# Patient Record
Sex: MALE | Race: WHITE | NOT HISPANIC OR LATINO | Employment: OTHER | URBAN - METROPOLITAN AREA
[De-identification: names, ages, dates, MRNs, and addresses within clinical notes are randomized per-mention and may not be internally consistent; named-entity substitution may affect disease eponyms.]

---

## 2017-01-04 ENCOUNTER — ANTICOAGULATION MONITORING (OUTPATIENT)
Dept: VASCULAR LAB | Facility: MEDICAL CENTER | Age: 82
End: 2017-01-04

## 2017-01-04 DIAGNOSIS — I48.20 CHRONIC ATRIAL FIBRILLATION (HCC): ICD-10-CM

## 2017-01-04 DIAGNOSIS — G45.9 TRANSIENT CEREBRAL ISCHEMIA, UNSPECIFIED TYPE: ICD-10-CM

## 2017-01-04 LAB — INR PPP: 2.5 (ref 2–3.5)

## 2017-01-23 LAB — INR PPP: 2.5 (ref 2–3.5)

## 2017-01-30 ENCOUNTER — ANTICOAGULATION MONITORING (OUTPATIENT)
Dept: VASCULAR LAB | Facility: MEDICAL CENTER | Age: 82
End: 2017-01-30

## 2017-01-30 DIAGNOSIS — G45.9 TRANSIENT CEREBRAL ISCHEMIA, UNSPECIFIED TYPE: ICD-10-CM

## 2017-01-30 DIAGNOSIS — I48.20 CHRONIC ATRIAL FIBRILLATION (HCC): ICD-10-CM

## 2017-01-30 NOTE — PROGRESS NOTES
Anticoagulation Summary as of 1/30/2017     INR goal 2.0-3.0   Selected INR 2.5 (1/23/2017)   Maintenance plan 5 mg (5 mg x 1) every day   Weekly total 35 mg   Plan last modified Estelle Malone PHARMD (3/2/2016)   Next INR check 2/27/2017   Target end date Indefinite    Indications   Chronic atrial fibrillation (CMS-HCC) [I48.2]  TIA (transient ischemic attack) [G45.9]         Anticoagulation Episode Summary     INR check location Home Draw    Preferred lab     Send INR reminders to     Yadira Donaldson       Anticoagulation Care Providers     Provider Role Specialty Phone number    Shalom Garzon M.D. Referring Cardiology [phone]    [facility] Anticoagulation Services Responsible  [phone]    Fermín Green PHARMD Responsible          Anticoagulation Patient Findings    Left voicemail message to report a therapeutic INR of 2.5.  Pt to continue with current warfarin dosing regimen. Requested pt contact the clinic for any s/s of unusual bleeding, bruising, clotting or any changes to diet or medication. FU 4 weeks.  Femrín Green PHARMD

## 2017-03-15 ENCOUNTER — TELEPHONE (OUTPATIENT)
Dept: VASCULAR LAB | Facility: MEDICAL CENTER | Age: 82
End: 2017-03-15

## 2017-03-23 ENCOUNTER — OFFICE VISIT (OUTPATIENT)
Dept: CARDIOLOGY | Facility: PHYSICIAN GROUP | Age: 82
End: 2017-03-23
Payer: MEDICARE

## 2017-03-23 VITALS
HEART RATE: 73 BPM | SYSTOLIC BLOOD PRESSURE: 82 MMHG | BODY MASS INDEX: 31.18 KG/M2 | HEIGHT: 74 IN | DIASTOLIC BLOOD PRESSURE: 60 MMHG | WEIGHT: 243 LBS | OXYGEN SATURATION: 93 %

## 2017-03-23 DIAGNOSIS — I35.8 AORTIC VALVE SCLEROSIS: ICD-10-CM

## 2017-03-23 DIAGNOSIS — I34.0 MILD MITRAL REGURGITATION: ICD-10-CM

## 2017-03-23 DIAGNOSIS — I48.20 CHRONIC ATRIAL FIBRILLATION (HCC): ICD-10-CM

## 2017-03-23 DIAGNOSIS — I27.20 PULMONARY HYPERTENSION (HCC): ICD-10-CM

## 2017-03-23 DIAGNOSIS — E80.6 HYPERBILIRUBINEMIA: ICD-10-CM

## 2017-03-23 DIAGNOSIS — G60.3 IDIOPATHIC PROGRESSIVE NEUROPATHY: ICD-10-CM

## 2017-03-23 DIAGNOSIS — E78.00 HYPERCHOLESTEROLEMIA: ICD-10-CM

## 2017-03-23 DIAGNOSIS — I34.0 NON-RHEUMATIC MITRAL REGURGITATION: ICD-10-CM

## 2017-03-23 DIAGNOSIS — Z79.01 CHRONIC ANTICOAGULATION: ICD-10-CM

## 2017-03-23 LAB — INR PPP: 2.7 (ref 2–3.5)

## 2017-03-23 PROCEDURE — 1036F TOBACCO NON-USER: CPT | Performed by: INTERNAL MEDICINE

## 2017-03-23 PROCEDURE — 4040F PNEUMOC VAC/ADMIN/RCVD: CPT | Mod: 8P | Performed by: INTERNAL MEDICINE

## 2017-03-23 PROCEDURE — G8432 DEP SCR NOT DOC, RNG: HCPCS | Performed by: INTERNAL MEDICINE

## 2017-03-23 PROCEDURE — G8484 FLU IMMUNIZE NO ADMIN: HCPCS | Performed by: INTERNAL MEDICINE

## 2017-03-23 PROCEDURE — 1101F PT FALLS ASSESS-DOCD LE1/YR: CPT | Mod: 8P | Performed by: INTERNAL MEDICINE

## 2017-03-23 PROCEDURE — 99214 OFFICE O/P EST MOD 30 MIN: CPT | Performed by: INTERNAL MEDICINE

## 2017-03-23 PROCEDURE — G8419 CALC BMI OUT NRM PARAM NOF/U: HCPCS | Performed by: INTERNAL MEDICINE

## 2017-03-23 RX ORDER — LISINOPRIL 2.5 MG/1
2.5 TABLET ORAL DAILY
Qty: 30 TAB | Refills: 6 | Status: SHIPPED | OUTPATIENT
Start: 2017-03-23 | End: 2018-01-09

## 2017-03-23 ASSESSMENT — ENCOUNTER SYMPTOMS
ORTHOPNEA: 0
BRUISES/BLEEDS EASILY: 0
FALLS: 0
WHEEZING: 0
BLOOD IN STOOL: 0
MYALGIAS: 0
CHILLS: 0
FEVER: 0
FLANK PAIN: 0
COUGH: 0

## 2017-03-23 ASSESSMENT — LIFESTYLE VARIABLES: SUBSTANCE_ABUSE: 0

## 2017-03-23 NOTE — PROGRESS NOTES
Subjective:   Troy Roberto is a 82 y.o. male who presents today for follow-up of his atrial fibrillation and anticoagulation. He has not been in in a year and a half and is getting gradually weaker and more easily confused and frequently somnolent during the day. His wife is not sure if he actually has sleep apnea or not because he is a restless sleeper. He has not had dyspnea nor orthopnea and has had no chest pain. He has not had syncope nor near syncope and has had no recurrence in the past 3 years of his transient ischemic attack symptoms. He does continue reliable follow-up of anticoagulation.    Past Medical History   Diagnosis Date   • Aortic valve disorders    • Atrial fibrillation (CMS-HCC)    • Long term (current) use of anticoagulants    • Disorders of bilirubin excretion    • Pure hypercholesterolemia    • Mitral valve disorders    • Pulmonary hypertension (CMS-HCC)    • TIA (transient ischemic attack) 2014     30 minutes of dysphasia      Past Surgical History   Procedure Laterality Date   • Shoulder surgery     • Tonsillectomy       Family History   Problem Relation Age of Onset   • Stroke Mother       at 83   • Heart Failure Father       at 84, also has h/o CVA     History   Smoking status   • Never Smoker    Smokeless tobacco   • Never Used     No Known Allergies  Outpatient Encounter Prescriptions as of 3/23/2017   Medication Sig Dispense Refill   • pravastatin (PRAVACHOL) 40 MG tablet Take 1 Tab by mouth every evening. 90 Tab 3   • oxybutynin (DITROPAN) 5 MG TABS Take 5 mg by mouth every bedtime.     • warfarin (COUMADIN) 5 MG TABS Take 5 mg by mouth every bedtime.     • finasteride (PROSCAR) 5 MG TABS Take 5 mg by mouth every day.     • lisinopril (PRINIVIL) 10 MG TABS Take 5 mg by mouth every day.     • metoprolol (LOPRESSOR) 50 MG TABS Take 25 mg by mouth 2 times a day.     This is his last medication list with me but we are not confident that this is accurate and his wife  "will bring in a new medication list.  No facility-administered encounter medications on file as of 3/23/2017.     Review of Systems   Constitutional: Negative for fever and chills.   HENT: Negative for nosebleeds.    Respiratory: Negative for cough and wheezing.    Cardiovascular: Negative for orthopnea.   Gastrointestinal: Negative for blood in stool and melena.   Genitourinary: Negative for hematuria and flank pain.   Musculoskeletal: Negative for myalgias and falls.   Endo/Heme/Allergies: Does not bruise/bleed easily.   Psychiatric/Behavioral: Negative for substance abuse.        Objective:   BP 82/60 mmHg  Pulse 73  Ht 1.88 m (6' 2\")  Wt 110.224 kg (243 lb)  BMI 31.19 kg/m2  SpO2 93%    Physical Exam   Constitutional: He is oriented to person, place, and time. He appears well-developed and well-nourished.   His gait is very limited and requires a walker.   Eyes: Conjunctivae are normal. No scleral icterus.   Neck: No JVD present.   Cardiovascular: Normal rate, S1 normal, S2 normal and intact distal pulses.  An irregularly irregular rhythm present. Exam reveals no gallop.    Murmur (2/6sem) heard.  Pulmonary/Chest: Effort normal and breath sounds normal.   Musculoskeletal: He exhibits no edema.   Chronic osteoarthritis of both hands.   Neurological: He is alert and oriented to person, place, and time.   Skin: Skin is warm and dry.   Psychiatric: He has a normal mood and affect.   He does seem more forgetful than previously       Assessment:     1. Chronic atrial fibrillation (CMS-HCC)  COMP METABOLIC PANEL    TSH   2. Pulmonary hypertension (CMS-HCC)  OVERNIGHT PULSE OXIMETRY   3. Non-rheumatic mitral regurgitation     4. Aortic valve sclerosis     5. Mild mitral regurgitation     6. Chronic anticoagulation  CBC WITH DIFFERENTIAL   7. Hypercholesterolemia  LIPID PROFILE   8. Hyperbilirubinemia     9. Idiopathic progressive neuropathy       Blood pressure seems definitely overcontrolled. We need an accurate " medication list but in any case need to drop the dose of lisinopril. Heart rate control is good and valve status is clinically stable but pulmonary hypertension needs reassessment. His peripheral neuropathy was never explained and I do not have any of the previous neurological evaluations. His gradually worsening cognitive dysfunction needs to be reassessed as well and may be at least in part due to nocturnal hypoxia.  Medical Decision Making:  Today's Assessment / Status / Plan:     Reduce lisinopril to 2.5 mg daily and get an accurate med list. Follow-up of this next month. Echocardiogram in [place] to reassess his valvular disease. Overnight pulse oximetry to assess the possibility of sleep apnea and begin reevaluation of his cognitive dysfunction as well as pulmonary hypertension. I suggested neurological reevaluation but he and his wife both state that they saw somebody in [place] and also somebody in [place] (I have no records of that) and a neurologist at [facility] all to no avail.  His B12 level has been tested several times as well as toxicology as part of the neurology evaluations.  I do have records of the B12 levels that I drew to workup his macrocytosis and they were normal. I also wanted them to get a comprehensive primary reevaluation but it has been hard for them to get in to see Basil for some reason. If they are going down to [place] to visit their daughter and I strongly suggest a revisit at [facility] neurology.

## 2017-03-23 NOTE — Clinical Note
[facility] [address]  Phone: [phone]  Fax: [phone]              Troy Roberto  1934    Encounter Date: 3/23/2017    Shalom Garzon M.D.          PROGRESS NOTE:  Subjective:   Troy Roberto is a 82 y.o. male who presents today for follow-up of his atrial fibrillation and anticoagulation. He has not been in in a year and a half and is getting gradually weaker and more easily confused and frequently somnolent during the day. His wife is not sure if he actually has sleep apnea or not because he is a restless sleeper. He has not had dyspnea nor orthopnea and has had no chest pain. He has not had syncope nor near syncope and has had no recurrence in the past 3 years of his transient ischemic attack symptoms. He does continue reliable follow-up of anticoagulation.    Past Medical History   Diagnosis Date   • Aortic valve disorders    • Atrial fibrillation (CMS-HCC)    • Long term (current) use of anticoagulants    • Disorders of bilirubin excretion    • Pure hypercholesterolemia    • Mitral valve disorders    • Pulmonary hypertension (CMS-HCC)    • TIA (transient ischemic attack) 2014     30 minutes of dysphasia      Past Surgical History   Procedure Laterality Date   • Shoulder surgery     • Tonsillectomy       Family History   Problem Relation Age of Onset   • Stroke Mother       at 83   • Heart Failure Father       at 84, also has h/o CVA     History   Smoking status   • Never Smoker    Smokeless tobacco   • Never Used     No Known Allergies  Outpatient Encounter Prescriptions as of 3/23/2017   Medication Sig Dispense Refill   • pravastatin (PRAVACHOL) 40 MG tablet Take 1 Tab by mouth every evening. 90 Tab 3   • oxybutynin (DITROPAN) 5 MG TABS Take 5 mg by mouth every bedtime.     • warfarin (COUMADIN) 5 MG TABS Take 5 mg by mouth every bedtime.     • finasteride (PROSCAR) 5 MG TABS Take 5  "mg by mouth every day.     • lisinopril (PRINIVIL) 10 MG TABS Take 5 mg by mouth every day.     • metoprolol (LOPRESSOR) 50 MG TABS Take 25 mg by mouth 2 times a day.     This is his last medication list with me but we are not confident that this is accurate and his wife will bring in a new medication list.  No facility-administered encounter medications on file as of 3/23/2017.     Review of Systems   Constitutional: Negative for fever and chills.   HENT: Negative for nosebleeds.    Respiratory: Negative for cough and wheezing.    Cardiovascular: Negative for orthopnea.   Gastrointestinal: Negative for blood in stool and melena.   Genitourinary: Negative for hematuria and flank pain.   Musculoskeletal: Negative for myalgias and falls.   Endo/Heme/Allergies: Does not bruise/bleed easily.   Psychiatric/Behavioral: Negative for substance abuse.        Objective:   BP 82/60 mmHg  Pulse 73  Ht 1.88 m (6' 2\")  Wt 110.224 kg (243 lb)  BMI 31.19 kg/m2  SpO2 93%    Physical Exam   Constitutional: He is oriented to person, place, and time. He appears well-developed and well-nourished.   His gait is very limited and requires a walker.   Eyes: Conjunctivae are normal. No scleral icterus.   Neck: No JVD present.   Cardiovascular: Normal rate, S1 normal, S2 normal and intact distal pulses.  An irregularly irregular rhythm present. Exam reveals no gallop.    Murmur (2/6sem) heard.  Pulmonary/Chest: Effort normal and breath sounds normal.   Musculoskeletal: He exhibits no edema.   Chronic osteoarthritis of both hands.   Neurological: He is alert and oriented to person, place, and time.   Skin: Skin is warm and dry.   Psychiatric: He has a normal mood and affect.   He does seem more forgetful than previously       Assessment:     1. Chronic atrial fibrillation (CMS-HCC)  COMP METABOLIC PANEL    TSH   2. Pulmonary hypertension (CMS-HCC)  OVERNIGHT PULSE OXIMETRY   3. Non-rheumatic mitral regurgitation     4. Aortic valve " sclerosis     5. Mild mitral regurgitation     6. Chronic anticoagulation  CBC WITH DIFFERENTIAL   7. Hypercholesterolemia  LIPID PROFILE   8. Hyperbilirubinemia     9. Idiopathic progressive neuropathy       Blood pressure seems definitely overcontrolled. We need an accurate medication list but in any case need to drop the dose of lisinopril. Heart rate control is good and valve status is clinically stable but pulmonary hypertension needs reassessment. His peripheral neuropathy was never explained and I do not have any of the previous neurological evaluations. His gradually worsening cognitive dysfunction needs to be reassessed as well and may be at least in part due to nocturnal hypoxia.  Medical Decision Making:  Today's Assessment / Status / Plan:     Reduce lisinopril to 2.5 mg daily and get an accurate med list. Follow-up of this next month. Echocardiogram in [place] to reassess his valvular disease. Overnight pulse oximetry to assess the possibility of sleep apnea and begin reevaluation of his cognitive dysfunction as well as pulmonary hypertension. I suggested neurological reevaluation but he and his wife both state that they saw somebody in [place] and also somebody in [place] (I have no records of that) and a neurologist at [facility] all to no avail.  His B12 level has been tested several times as well as toxicology as part of the neurology evaluations.  I do have records of the B12 levels that I drew to workup his macrocytosis and they were normal. I also wanted them to get a comprehensive primary reevaluation but it has been hard for them to get in to see Basil for some reason. If they are going down to [place] to visit their daughter and I strongly suggest a revisit at [facility] neurology.      No Recipients

## 2017-03-24 DIAGNOSIS — I34.0 NON-RHEUMATIC MITRAL REGURGITATION: ICD-10-CM

## 2017-04-04 ENCOUNTER — TELEPHONE (OUTPATIENT)
Dept: VASCULAR LAB | Facility: MEDICAL CENTER | Age: 82
End: 2017-04-04

## 2017-04-04 NOTE — TELEPHONE ENCOUNTER
Left message with patient wife for pt to have INR checked  Left message for pt to have INR checked

## 2017-04-18 DIAGNOSIS — I34.0 NON-RHEUMATIC MITRAL REGURGITATION: ICD-10-CM

## 2017-04-19 ENCOUNTER — TELEPHONE (OUTPATIENT)
Dept: VASCULAR LAB | Facility: MEDICAL CENTER | Age: 82
End: 2017-04-19

## 2017-05-02 ENCOUNTER — ANTICOAGULATION MONITORING (OUTPATIENT)
Dept: VASCULAR LAB | Facility: MEDICAL CENTER | Age: 82
End: 2017-05-02

## 2017-05-02 DIAGNOSIS — G45.1 HEMISPHERIC CAROTID ARTERY SYNDROME: ICD-10-CM

## 2017-05-02 DIAGNOSIS — I48.20 CHRONIC ATRIAL FIBRILLATION (HCC): ICD-10-CM

## 2017-05-08 ENCOUNTER — TELEPHONE (OUTPATIENT)
Dept: CARDIOLOGY | Facility: MEDICAL CENTER | Age: 82
End: 2017-05-08

## 2017-05-08 NOTE — TELEPHONE ENCOUNTER
----- Message from Shalom Garzon M.D. sent at 5/6/2017 11:54 AM PDT -----  Please let Ancelmo know that the lab and echo were good.

## 2017-05-17 ENCOUNTER — TELEPHONE (OUTPATIENT)
Dept: VASCULAR LAB | Facility: MEDICAL CENTER | Age: 82
End: 2017-05-17

## 2017-05-17 DIAGNOSIS — I48.20 CHRONIC ATRIAL FIBRILLATION (HCC): ICD-10-CM

## 2017-05-17 NOTE — TELEPHONE ENCOUNTER
Left message for pt to have INR checked.  Patients wife having trouble getting patient around, will try to test in next week or two.  Updated SO faxed to [facility] [facility].  Fermín Green, KAID

## 2017-05-30 LAB — INR PPP: 1.1 (ref 2–3.5)

## 2017-05-31 ENCOUNTER — ANTICOAGULATION MONITORING (OUTPATIENT)
Dept: VASCULAR LAB | Facility: MEDICAL CENTER | Age: 82
End: 2017-05-31

## 2017-05-31 DIAGNOSIS — I48.20 CHRONIC ATRIAL FIBRILLATION (HCC): ICD-10-CM

## 2017-05-31 DIAGNOSIS — G45.1 HEMISPHERIC CAROTID ARTERY SYNDROME: ICD-10-CM

## 2017-05-31 NOTE — PROGRESS NOTES
Anticoagulation Summary as of 5/31/2017     INR goal 2.0-3.0   Selected INR 1.1! (5/30/2017)   Maintenance plan 5 mg (5 mg x 1) every day   Weekly total 35 mg   Plan last modified Estelle Malone PHARMD (3/2/2016)   Next INR check 6/5/2017   Target end date Indefinite    Indications   Chronic atrial fibrillation (CMS-HCC) [I48.2]  TIA (transient ischemic attack) [G45.9]         Anticoagulation Episode Summary     INR check location Home Draw    Preferred lab     Send INR reminders to     Yadira Donaldson       Anticoagulation Care Providers     Provider Role Specialty Phone number    Shalom Garzon M.D. Referring Cardiology [phone]    [facility] Anticoagulation Services Responsible  [phone]    Fermín Green PHARMD Responsible          Anticoagulation Patient Findings   Positives Missed Doses        Spoke with patients wife Maral today regarding subtherapeutic INR of 1.1.  Patient denies any signs/symptoms of bruising or bleeding or any changes in diet and medications.  Instructed patient to call clinic with any questions or concerns.  She is unsure if patient has missed doses and how many.  She states he has dementia and is becoming harder to manage.  Encouraged her to have him establish with new PCP to begin discussing treatment options and possibly place referral for specialist.  Given patient's history of TIA, encouraged bridging with lovenox.  She is not interested at this time based on their personal situation.  She understands risks.  Instructed patient to bolus with 10mg X 2, then resume current warfarin regimen.  Follow up in 5 days per patient.    Fermín Green PHARMD

## 2017-06-05 ENCOUNTER — ANTICOAGULATION MONITORING (OUTPATIENT)
Dept: VASCULAR LAB | Facility: MEDICAL CENTER | Age: 82
End: 2017-06-05

## 2017-06-05 DIAGNOSIS — G45.1 HEMISPHERIC CAROTID ARTERY SYNDROME: ICD-10-CM

## 2017-06-05 DIAGNOSIS — I48.20 CHRONIC ATRIAL FIBRILLATION (HCC): ICD-10-CM

## 2017-06-05 LAB — INR PPP: 1.3 (ref 2–3.5)

## 2017-06-06 NOTE — PROGRESS NOTES
OP Anticoagulation Service Note    Date: 6/5/2017    Anticoagulation Summary as of 6/5/2017     INR goal 2.0-3.0   Selected INR 1.3! (6/5/2017)   Maintenance plan 7.5 mg (5 mg x 1.5) on Mon, Wed, Fri; 5 mg (5 mg x 1) all other days   Weekly total 42.5 mg   Plan last modified Estelle Malone PHARMD (6/5/2017)   Next INR check 6/12/2017   Target end date Indefinite    Indications   Chronic atrial fibrillation (CMS-HCC) [I48.2]  TIA (transient ischemic attack) [G45.9]         Anticoagulation Episode Summary     INR check location Home Draw    Preferred lab     Send INR reminders to     Comments Anika       Anticoagulation Care Providers     Provider Role Specialty Phone number    Shalom Garzon M.D. Referring Cardiology [phone]    [facility] Anticoagulation Services Responsible  [phone]    Fermín Green PHARMD Responsible          Anticoagulation Patient Findings      Plan:  INR is low today despite 2 bolus doses last week. Spoke with pt and his wife on the phone.   Confirmed he followed dosing instructions. Confirmed tablet size. He denies V8 juice, or protein shakes. No changes in medication recently. Patient denies sign/symptoms of bleeding/clotting. No recent medication changes and patient has been eating a consistent diet.  Instructed pt to call clinic with any concerns of bleeding or thrombosis. Instructed pt to take 10 mg x 2 doses then begin increased weekly regimen. Pt is aware of risk of subtherapeutic INR, declines bridging. WIll seek immediate attention for s/s of stroke.  Follow up in 1 week(s)      Estelle Malone PHARMD

## 2017-06-29 ENCOUNTER — OFFICE VISIT (OUTPATIENT)
Dept: CARDIOLOGY | Facility: PHYSICIAN GROUP | Age: 82
End: 2017-06-29
Payer: MEDICARE

## 2017-06-29 VITALS
WEIGHT: 238 LBS | SYSTOLIC BLOOD PRESSURE: 100 MMHG | HEIGHT: 74 IN | DIASTOLIC BLOOD PRESSURE: 60 MMHG | HEART RATE: 70 BPM | BODY MASS INDEX: 30.54 KG/M2

## 2017-06-29 DIAGNOSIS — I48.20 CHRONIC ATRIAL FIBRILLATION (HCC): ICD-10-CM

## 2017-06-29 DIAGNOSIS — I27.20 PULMONARY HYPERTENSION (HCC): ICD-10-CM

## 2017-06-29 DIAGNOSIS — Z79.01 CHRONIC ANTICOAGULATION: ICD-10-CM

## 2017-06-29 PROCEDURE — 99213 OFFICE O/P EST LOW 20 MIN: CPT | Performed by: INTERNAL MEDICINE

## 2017-06-29 ASSESSMENT — ENCOUNTER SYMPTOMS
ORTHOPNEA: 0
WHEEZING: 0
COUGH: 0
MYALGIAS: 0
PND: 0

## 2017-06-29 ASSESSMENT — LIFESTYLE VARIABLES: SUBSTANCE_ABUSE: 0

## 2017-06-29 NOTE — PROGRESS NOTES
"Subjective:   Troy Roberto is a 83 y.o. male who presents today for follow-up of his pulmonary hypertension and atrial fibrillation. The overnight pulse oximetry never got done.  His other studies were done and were very satisfactory.  Past Medical History   Diagnosis Date   • Aortic valve disorders    • Atrial fibrillation (CMS-HCC)    • Long term (current) use of anticoagulants    • Disorders of bilirubin excretion    • Pure hypercholesterolemia    • Mitral valve disorders    • Pulmonary hypertension (CMS-HCC)    • TIA (transient ischemic attack) 2014     30 minutes of dysphasia      Past Surgical History   Procedure Laterality Date   • Shoulder surgery     • Tonsillectomy       Family History   Problem Relation Age of Onset   • Stroke Mother       at 83   • Heart Failure Father       at 84, also has h/o CVA     History   Smoking status   • Never Smoker    Smokeless tobacco   • Never Used     No Known Allergies  Outpatient Encounter Prescriptions as of 2017   Medication Sig Dispense Refill   • lisinopril (PRINIVIL) 2.5 MG Tab Take 1 Tab by mouth every day. 30 Tab 6   • pravastatin (PRAVACHOL) 40 MG tablet Take 1 Tab by mouth every evening. 90 Tab 3   • oxybutynin (DITROPAN) 5 MG TABS Take 5 mg by mouth every bedtime.     • warfarin (COUMADIN) 5 MG TABS Take 5 mg by mouth every bedtime.     • finasteride (PROSCAR) 5 MG TABS Take 5 mg by mouth every day.     • metoprolol (LOPRESSOR) 50 MG TABS Take 25 mg by mouth 2 times a day.       No facility-administered encounter medications on file as of 2017.     Review of Systems   Respiratory: Negative for cough and wheezing.    Cardiovascular: Negative for orthopnea and PND.   Musculoskeletal: Negative for myalgias and joint pain.   Psychiatric/Behavioral: Negative for substance abuse.        Objective:   /60 mmHg  Pulse 70  Ht 1.88 m (6' 2\")  Wt 107.956 kg (238 lb)  BMI 30.54 kg/m2    Physical Exam   Constitutional: He is oriented " to person, place, and time. He appears well-developed and well-nourished.   His gait is very limited and requires a walker.   Eyes: Conjunctivae are normal. No scleral icterus.   Neck: No JVD present.   Cardiovascular: Normal rate, S1 normal, S2 normal and intact distal pulses.  An irregularly irregular rhythm present. Exam reveals no gallop.    Murmur (2/6sem) heard.  Pulmonary/Chest: Effort normal and breath sounds normal.   Musculoskeletal: He exhibits no edema.   Chronic osteoarthritis of both hands.   Neurological: He is alert and oriented to person, place, and time.   Skin: Skin is warm and dry.   Psychiatric: He has a normal mood and affect.       Assessment:     1. Chronic atrial fibrillation (CMS-HCC)     2. Pulmonary hypertension (CMS-HCC)     3. Chronic anticoagulation       The above assessed cardiovascular problems are clinically stable.  He is followed the in the anticoag clinic.  Medical Decision Making:  Today's Assessment / Status / Plan:     Continue the current cardiovascular regimen.  Get the OPO.  Continue primary follow up with  Dr. Riojas.   Cardiology follow up in 3 months and  sooner if needed for any change. Use of the emergency medical system reviewed.

## 2017-06-29 NOTE — Clinical Note
[facility] [address]  Phone: [phone]  Fax: [phone]              Troy Roberto  1934    Encounter Date: 2017    Shalom Garzon M.D.          PROGRESS NOTE:  Subjective:   Troy Roberto is a 83 y.o. male who presents today for follow-up of his pulmonary hypertension and atrial fibrillation. The overnight pulse oximetry never got done.  His other studies were done and were very satisfactory.  Past Medical History   Diagnosis Date   • Aortic valve disorders    • Atrial fibrillation (CMS-HCC)    • Long term (current) use of anticoagulants    • Disorders of bilirubin excretion    • Pure hypercholesterolemia    • Mitral valve disorders    • Pulmonary hypertension (CMS-HCC)    • TIA (transient ischemic attack) 2014     30 minutes of dysphasia      Past Surgical History   Procedure Laterality Date   • Shoulder surgery     • Tonsillectomy       Family History   Problem Relation Age of Onset   • Stroke Mother       at 83   • Heart Failure Father       at 84, also has h/o CVA     History   Smoking status   • Never Smoker    Smokeless tobacco   • Never Used     No Known Allergies  Outpatient Encounter Prescriptions as of 2017   Medication Sig Dispense Refill   • lisinopril (PRINIVIL) 2.5 MG Tab Take 1 Tab by mouth every day. 30 Tab 6   • pravastatin (PRAVACHOL) 40 MG tablet Take 1 Tab by mouth every evening. 90 Tab 3   • oxybutynin (DITROPAN) 5 MG TABS Take 5 mg by mouth every bedtime.     • warfarin (COUMADIN) 5 MG TABS Take 5 mg by mouth every bedtime.     • finasteride (PROSCAR) 5 MG TABS Take 5 mg by mouth every day.     • metoprolol (LOPRESSOR) 50 MG TABS Take 25 mg by mouth 2 times a day.       No facility-administered encounter medications on file as of 2017.     Review of Systems   Respiratory: Negative for cough and wheezing.    Cardiovascular: Negative for orthopnea and  "PND.   Musculoskeletal: Negative for myalgias and joint pain.   Psychiatric/Behavioral: Negative for substance abuse.        Objective:   /60 mmHg  Pulse 70  Ht 1.88 m (6' 2\")  Wt 107.956 kg (238 lb)  BMI 30.54 kg/m2    Physical Exam   Constitutional: He is oriented to person, place, and time. He appears well-developed and well-nourished.   His gait is very limited and requires a walker.   Eyes: Conjunctivae are normal. No scleral icterus.   Neck: No JVD present.   Cardiovascular: Normal rate, S1 normal, S2 normal and intact distal pulses.  An irregularly irregular rhythm present. Exam reveals no gallop.    Murmur (2/6sem) heard.  Pulmonary/Chest: Effort normal and breath sounds normal.   Musculoskeletal: He exhibits no edema.   Chronic osteoarthritis of both hands.   Neurological: He is alert and oriented to person, place, and time.   Skin: Skin is warm and dry.   Psychiatric: He has a normal mood and affect.       Assessment:     1. Chronic atrial fibrillation (CMS-HCC)     2. Pulmonary hypertension (CMS-HCC)     3. Chronic anticoagulation       The above assessed cardiovascular problems are clinically stable.  He is followed the in the [facility] clinic.  Medical Decision Making:  Today's Assessment / Status / Plan:     Continue the current cardiovascular regimen.  Get the OPO.  Continue primary follow up with  Dr. Riojas.   Cardiology follow up in 3 months and  sooner if needed for any change. Use of the emergency medical system reviewed.       No Recipients                "

## 2017-06-30 ENCOUNTER — TELEPHONE (OUTPATIENT)
Dept: CARDIOLOGY | Facility: MEDICAL CENTER | Age: 82
End: 2017-06-30

## 2017-08-02 ENCOUNTER — TELEPHONE (OUTPATIENT)
Dept: VASCULAR LAB | Facility: MEDICAL CENTER | Age: 82
End: 2017-08-02

## 2017-08-23 ENCOUNTER — ANTICOAGULATION MONITORING (OUTPATIENT)
Dept: VASCULAR LAB | Facility: MEDICAL CENTER | Age: 82
End: 2017-08-23

## 2017-08-23 DIAGNOSIS — I48.20 CHRONIC ATRIAL FIBRILLATION (HCC): ICD-10-CM

## 2017-08-23 DIAGNOSIS — G45.9 TRANSIENT CEREBRAL ISCHEMIA, UNSPECIFIED TYPE: ICD-10-CM

## 2017-09-07 ENCOUNTER — TELEPHONE (OUTPATIENT)
Dept: VASCULAR LAB | Facility: MEDICAL CENTER | Age: 82
End: 2017-09-07

## 2017-09-07 NOTE — TELEPHONE ENCOUNTER
Left message for pt to have INR checked.  Letter of compliance mailed to home address as well.  Fermín Green, PharmD

## 2017-10-05 LAB — INR PPP: 1 (ref 2–3.5)

## 2017-10-06 ENCOUNTER — ANTICOAGULATION MONITORING (OUTPATIENT)
Dept: VASCULAR LAB | Facility: MEDICAL CENTER | Age: 82
End: 2017-10-06

## 2017-10-06 DIAGNOSIS — I48.20 CHRONIC ATRIAL FIBRILLATION (HCC): ICD-10-CM

## 2017-10-13 ENCOUNTER — OFFICE VISIT (OUTPATIENT)
Dept: CARDIOLOGY | Facility: PHYSICIAN GROUP | Age: 82
End: 2017-10-13
Payer: MEDICARE

## 2017-10-13 VITALS
BODY MASS INDEX: 29.39 KG/M2 | HEIGHT: 74 IN | DIASTOLIC BLOOD PRESSURE: 80 MMHG | OXYGEN SATURATION: 98 % | HEART RATE: 95 BPM | SYSTOLIC BLOOD PRESSURE: 112 MMHG | WEIGHT: 229 LBS

## 2017-10-13 DIAGNOSIS — M79.10 MYALGIA: ICD-10-CM

## 2017-10-13 DIAGNOSIS — I48.20 CHRONIC ATRIAL FIBRILLATION (HCC): ICD-10-CM

## 2017-10-13 DIAGNOSIS — Z79.01 CHRONIC ANTICOAGULATION: ICD-10-CM

## 2017-10-13 DIAGNOSIS — M19.90 ARTHRITIS: ICD-10-CM

## 2017-10-13 DIAGNOSIS — E78.00 HYPERCHOLESTEROLEMIA: ICD-10-CM

## 2017-10-13 PROCEDURE — 99213 OFFICE O/P EST LOW 20 MIN: CPT | Performed by: INTERNAL MEDICINE

## 2017-10-13 ASSESSMENT — ENCOUNTER SYMPTOMS
HEMOPTYSIS: 0
ORTHOPNEA: 0
PND: 0
COUGH: 0
MYALGIAS: 1
BLOOD IN STOOL: 0

## 2017-10-13 NOTE — PROGRESS NOTES
Subjective:   Troy Roberto is a 83 y.o. male who presents today for atrial fibrillation and his anticoagulation. Cardiac status has been clinically stable but his arthritis and general myalgias have been worse. He has not had dyspnea. He never did get the OPO but did see neurology and is following through with workup of peripheral neuropathy.    Past Medical History:   Diagnosis Date   • TIA (transient ischemic attack) 2014    30 minutes of dysphasia    • Aortic valve disorders    • Atrial fibrillation (CMS-HCC)    • Disorders of bilirubin excretion    • Long term (current) use of anticoagulants    • Mitral valve disorders(424.0)    • Pulmonary hypertension    • Pure hypercholesterolemia      Past Surgical History:   Procedure Laterality Date   • SHOULDER SURGERY     • TONSILLECTOMY       Family History   Problem Relation Age of Onset   • Stroke Mother       at 83   • Heart Failure Father       at 84, also has h/o CVA     History   Smoking Status   • Never Smoker   Smokeless Tobacco   • Never Used     Not on File  Outpatient Encounter Prescriptions as of 10/13/2017   Medication Sig Dispense Refill   • lisinopril (PRINIVIL) 2.5 MG Tab Take 1 Tab by mouth every day. 30 Tab 6   • pravastatin (PRAVACHOL) 40 MG tablet Take 1 Tab by mouth every evening. 90 Tab 3   • oxybutynin (DITROPAN) 5 MG TABS Take 5 mg by mouth every bedtime.     • warfarin (COUMADIN) 5 MG TABS Take 5 mg by mouth every bedtime.     • finasteride (PROSCAR) 5 MG TABS Take 5 mg by mouth every day.     • metoprolol (LOPRESSOR) 50 MG TABS Take 25 mg by mouth 2 times a day.       No facility-administered encounter medications on file as of 10/13/2017.      Review of Systems   HENT: Negative for nosebleeds.    Respiratory: Negative for cough and hemoptysis.    Cardiovascular: Negative for orthopnea and PND.   Gastrointestinal: Negative for blood in stool and melena.   Musculoskeletal: Positive for joint pain and myalgias.         "Objective:   /80   Pulse 95   Ht 1.88 m (6' 2\")   Wt 103.9 kg (229 lb)   SpO2 98%   BMI 29.40 kg/m²     Physical Exam   Constitutional: He is oriented to person, place, and time. He appears well-developed and well-nourished.   He requires a walker.   Eyes: Conjunctivae are normal. No scleral icterus.   Neck: No JVD present.   Cardiovascular: Normal rate, S1 normal, S2 normal and intact distal pulses.  An irregularly irregular rhythm present. Exam reveals no gallop.    Murmur (2/6sem) heard.  Pulmonary/Chest: Effort normal and breath sounds normal.   Musculoskeletal: He exhibits no edema.   Arthritis appears worse   Neurological: He is alert and oriented to person, place, and time.   Skin: Skin is warm and dry.   Psychiatric: He has a normal mood and affect.       Assessment:     1. Chronic atrial fibrillation (CMS-HCC)     2. Chronic anticoagulation     3. Arthritis     4. Myalgia     5. Hypercholesterolemia       The above assessed cardiovascular problems are clinically stable. The myalgias and arthritis need f/u  Medical Decision Making:  Today's Assessment / Status / Plan:   Continue the current cardiovascular regimen.  Continue primary follow up with  Dr. Riojas.   Cardiology follow up in 3 months and  sooner if needed for any change.   Lab before next visit with Basil: CBC,CMP,ESR,LP,TSH.  Use of the emergency medical system reviewed.   Addendum: it turns out he did not stop the pravastatin after lab and did have fall and rhabdomyolysis was noted.  Pravastatin has now been stopped and he is much better from the myopathy but still has severe arthritis.  "

## 2017-10-13 NOTE — LETTER
[facility] [address]  Phone: [phone]  Fax: [phone]              Troy Roberto  1934    Encounter Date: 10/13/2017    Shalom Garzon M.D.          PROGRESS NOTE:  Subjective:   Troy Roberto is a 83 y.o. male who presents today for atrial fibrillation and his anticoagulation. Cardiac status has been clinically stable but his arthritis and general myalgias have been worse. He has not had dyspnea. He never did get the OPO but did see neurology and is following through with workup of peripheral neuropathy.    Past Medical History:   Diagnosis Date   • TIA (transient ischemic attack) 2014    30 minutes of dysphasia    • Aortic valve disorders    • Atrial fibrillation (CMS-HCC)    • Disorders of bilirubin excretion    • Long term (current) use of anticoagulants    • Mitral valve disorders(424.0)    • Pulmonary hypertension    • Pure hypercholesterolemia      Past Surgical History:   Procedure Laterality Date   • SHOULDER SURGERY     • TONSILLECTOMY       Family History   Problem Relation Age of Onset   • Stroke Mother       at 83   • Heart Failure Father       at 84, also has h/o CVA     History   Smoking Status   • Never Smoker   Smokeless Tobacco   • Never Used     Not on File  Outpatient Encounter Prescriptions as of 10/13/2017   Medication Sig Dispense Refill   • lisinopril (PRINIVIL) 2.5 MG Tab Take 1 Tab by mouth every day. 30 Tab 6   • pravastatin (PRAVACHOL) 40 MG tablet Take 1 Tab by mouth every evening. 90 Tab 3   • oxybutynin (DITROPAN) 5 MG TABS Take 5 mg by mouth every bedtime.     • warfarin (COUMADIN) 5 MG TABS Take 5 mg by mouth every bedtime.     • finasteride (PROSCAR) 5 MG TABS Take 5 mg by mouth every day.     • metoprolol (LOPRESSOR) 50 MG TABS Take 25 mg by mouth 2 times a day.       No facility-administered encounter medications on file as of 10/13/2017.      Review of  "Systems   HENT: Negative for nosebleeds.    Respiratory: Negative for cough and hemoptysis.    Cardiovascular: Negative for orthopnea and PND.   Gastrointestinal: Negative for blood in stool and melena.   Musculoskeletal: Positive for joint pain and myalgias.        Objective:   /80   Pulse 95   Ht 1.88 m (6' 2\")   Wt 103.9 kg (229 lb)   SpO2 98%   BMI 29.40 kg/m²      Physical Exam   Constitutional: He is oriented to person, place, and time. He appears well-developed and well-nourished.   He requires a walker.   Eyes: Conjunctivae are normal. No scleral icterus.   Neck: No JVD present.   Cardiovascular: Normal rate, S1 normal, S2 normal and intact distal pulses.  An irregularly irregular rhythm present. Exam reveals no gallop.    Murmur (2/6sem) heard.  Pulmonary/Chest: Effort normal and breath sounds normal.   Musculoskeletal: He exhibits no edema.   Arthritis appears worse   Neurological: He is alert and oriented to person, place, and time.   Skin: Skin is warm and dry.   Psychiatric: He has a normal mood and affect.       Assessment:     1. Chronic atrial fibrillation (CMS-HCC)     2. Chronic anticoagulation     3. Arthritis     4. Myalgia     5. Hypercholesterolemia       The above assessed cardiovascular problems are clinically stable. The myalgias and arthritis need f/u  Medical Decision Making:  Today's Assessment / Status / Plan:   Continue the current cardiovascular regimen.  Continue primary follow up with  Dr. Riojas.   Cardiology follow up in 3 months and  sooner if needed for any change.   Lab before next visit with Basil: CBC,CMP,ESR,LP,TSH.  Use of the emergency medical system reviewed.       Basil Riojas M.D.  [address] [facility] [address]  VIA Facsimile: [phone]                 "

## 2017-10-25 ENCOUNTER — TELEPHONE (OUTPATIENT)
Dept: VASCULAR LAB | Facility: MEDICAL CENTER | Age: 82
End: 2017-10-25

## 2017-11-21 ENCOUNTER — TELEPHONE (OUTPATIENT)
Dept: VASCULAR LAB | Facility: MEDICAL CENTER | Age: 82
End: 2017-11-21

## 2017-12-14 ENCOUNTER — TELEPHONE (OUTPATIENT)
Dept: CARDIOLOGY | Facility: PHYSICIAN GROUP | Age: 82
End: 2017-12-14

## 2017-12-14 PROBLEM — G72.0 STATIN MYOPATHY: Status: ACTIVE | Noted: 2017-12-14

## 2017-12-14 PROBLEM — T46.6X5A STATIN MYOPATHY: Status: ACTIVE | Noted: 2017-12-14

## 2017-12-15 NOTE — TELEPHONE ENCOUNTER
Progressive dementia. He had a fall and was hospitalized and had significant rhabdomyolysis. I thought he had stopped his pravastatin after the lab came back in October with a normal sedimentation rate but Maral is pretty sure that he had not.  We discussed it today and she thinks he has been pretty confused.  In any case, pravastatin has been permanently discontinued.  He is doing very well now and is alert and oriented.  It is not clear that he didn't have a fall and prolonged down time which caused this but the myalgias were clearly preceding this event.

## 2018-01-04 ENCOUNTER — ANTICOAGULATION MONITORING (OUTPATIENT)
Dept: VASCULAR LAB | Facility: MEDICAL CENTER | Age: 83
End: 2018-01-04

## 2018-01-04 DIAGNOSIS — I48.20 CHRONIC ATRIAL FIBRILLATION (HCC): ICD-10-CM

## 2018-01-04 DIAGNOSIS — G45.9 TRANSIENT CEREBRAL ISCHEMIA, UNSPECIFIED TYPE: ICD-10-CM

## 2018-01-04 NOTE — PROGRESS NOTES
Patient's anticoagulation now managed by [facility].  Pending further contact from patient or PCP, will discharge from anticoagulation clinic.  Fermín Green, EstebanD

## 2018-01-09 ENCOUNTER — OFFICE VISIT (OUTPATIENT)
Dept: CARDIOLOGY | Facility: PHYSICIAN GROUP | Age: 83
End: 2018-01-09
Payer: MEDICARE

## 2018-01-09 VITALS
WEIGHT: 210 LBS | HEIGHT: 74 IN | DIASTOLIC BLOOD PRESSURE: 70 MMHG | HEART RATE: 137 BPM | BODY MASS INDEX: 26.95 KG/M2 | SYSTOLIC BLOOD PRESSURE: 124 MMHG | OXYGEN SATURATION: 98 %

## 2018-01-09 DIAGNOSIS — I48.20 CHRONIC ATRIAL FIBRILLATION (HCC): ICD-10-CM

## 2018-01-09 DIAGNOSIS — Z79.01 CHRONIC ANTICOAGULATION: ICD-10-CM

## 2018-01-09 DIAGNOSIS — T46.6X5A STATIN MYOPATHY: ICD-10-CM

## 2018-01-09 DIAGNOSIS — G45.9 TRANSIENT CEREBRAL ISCHEMIA, UNSPECIFIED TYPE: ICD-10-CM

## 2018-01-09 DIAGNOSIS — G72.0 STATIN MYOPATHY: ICD-10-CM

## 2018-01-09 DIAGNOSIS — I35.0 MILD AORTIC STENOSIS: Chronic | ICD-10-CM

## 2018-01-09 PROCEDURE — 99214 OFFICE O/P EST MOD 30 MIN: CPT | Performed by: INTERNAL MEDICINE

## 2018-01-09 RX ORDER — FUROSEMIDE 20 MG/1
20 TABLET ORAL 2 TIMES DAILY
COMMUNITY

## 2018-01-09 RX ORDER — MIRABEGRON 50 MG/1
TABLET, FILM COATED, EXTENDED RELEASE ORAL
Refills: 0 | COMMUNITY
Start: 2017-12-17

## 2018-01-09 RX ORDER — DILTIAZEM HYDROCHLORIDE 120 MG/1
120 CAPSULE, COATED, EXTENDED RELEASE ORAL DAILY
COMMUNITY

## 2018-01-09 RX ORDER — POTASSIUM CHLORIDE 750 MG/1
10 TABLET, EXTENDED RELEASE ORAL 2 TIMES DAILY
COMMUNITY

## 2018-01-09 RX ORDER — GABAPENTIN 300 MG/1
300 CAPSULE ORAL 3 TIMES DAILY
COMMUNITY

## 2018-01-09 NOTE — LETTER
[facility] [address]  Phone: [phone]  Fax: [phone]              Troy Roberto  1934    Encounter Date: 2018    Moose Tyler M.D.          PROGRESS NOTE:  Subjective:   Troy Roberto is a 83 y.o. male who presents today for follow-up of her history of mild aortic valve, left bundle branch block, hypertension    He had a fall in December with elevated CPKs statin was stopped he follows with neurology      Past Medical History:   Diagnosis Date   • Aortic valve disorders    • Atrial fibrillation (CMS-HCC)    • Disorders of bilirubin excretion    • Long term (current) use of anticoagulants    • Mild aortic stenosis    • Mitral valve disorders(424.0)    • Pulmonary hypertension    • Pure hypercholesterolemia    • TIA (transient ischemic attack) 2014    30 minutes of dysphasia      Past Surgical History:   Procedure Laterality Date   • SHOULDER SURGERY     • TONSILLECTOMY       Family History   Problem Relation Age of Onset   • Stroke Mother       at 83   • Heart Failure Father       at 84, also has h/o CVA     History   Smoking Status   • Never Smoker   Smokeless Tobacco   • Never Used     Not on File  Outpatient Encounter Prescriptions as of 2018   Medication Sig Dispense Refill   • MYRBETRIQ 50 MG TABLET SR 24 HR GIVE 1 TABLET BY MOUTH DAILY  0   • gabapentin (NEURONTIN) 300 MG Cap Take 300 mg by mouth 3 times a day.     • diltiazem CD (CARDIZEM CD) 120 MG CAPSULE SR 24 HR Take 120 mg by mouth every day.     • furosemide (LASIX) 20 MG Tab Take 20 mg by mouth 2 times a day.     • potassium chloride SA (K-DUR) 10 MEQ Tab CR Take 10 mEq by mouth 2 times a day.     • warfarin (COUMADIN) 5 MG TABS Take 5 mg by mouth every bedtime. 4.5mg     • [DISCONTINUED] lisinopril (PRINIVIL) 2.5 MG Tab Take 1 Tab by mouth every day. 30 Tab 6   • [DISCONTINUED] oxybutynin (DITROPAN) 5 MG TABS  "Take 5 mg by mouth every bedtime.     • [DISCONTINUED] finasteride (PROSCAR) 5 MG TABS Take 5 mg by mouth every day.     • [DISCONTINUED] metoprolol (LOPRESSOR) 50 MG TABS Take 25 mg by mouth 2 times a day.       No facility-administered encounter medications on file as of 1/9/2018.      Review of Systems   Constitutional: Negative for chills and fever.   HENT: Negative for sore throat.    Eyes: Negative for blurred vision.   Respiratory: Negative for cough and shortness of breath.    Cardiovascular: Negative for chest pain, palpitations, claudication, leg swelling and PND.   Gastrointestinal: Negative for abdominal pain and nausea.   Musculoskeletal: Negative for falls and joint pain.   Skin: Negative for rash.   Neurological: Negative for dizziness, focal weakness and weakness.   Endo/Heme/Allergies: Does not bruise/bleed easily.        Objective:   /70   Pulse (!) 137   Ht 1.88 m (6' 2\")   Wt 95.3 kg (210 lb)   SpO2 98%   BMI 26.96 kg/m²      Physical Exam   Constitutional: No distress.   HENT:   Mouth/Throat: Oropharynx is clear and moist.   Eyes: No scleral icterus.   Neck: Neck supple. No JVD present.   Cardiovascular: Normal rate, regular rhythm, normal heart sounds and intact distal pulses.  Exam reveals no gallop and no friction rub.    No murmur heard.  Pulmonary/Chest: Effort normal. He has no rales.   Abdominal: Soft. Bowel sounds are normal. There is no tenderness.   Musculoskeletal: He exhibits no edema.   Neurological: He is alert.   Skin: No rash noted. He is not diaphoretic.   Psychiatric: He has a normal mood and affect.       Assessment:     1. Transient cerebral ischemia, unspecified type     2. Mild aortic stenosis     3. Chronic atrial fibrillation (CMS-HCC)     4. Chronic anticoagulation  COMP METABOLIC PANEL    CBC WITH DIFFERENTIAL   5. Statin myopathy  TSH WITH REFLEX TO FT4    WESTERGREN SED RATE    CPK - TOTAL SERUM       Medical Decision Making:  Today's Assessment / Status / " Plan:     It was my pleasure to meet with Mr. Roberto.    His blood pressure is well controlled    We will update his labs    I will see Mr. Roberto back in 1 year time and encouraged him to follow up with us over the phone or e-mail using my MyChart as issues arise.    It is my pleasure to participate in the care of Mr. Roberto.  Please do not hesitate to contact me with questions or concerns.    Moose Tyler MD PhD FAC  Cardiologist [facility]        Basil Riojas M.D.  [address] [place] [id number]  VIA Facsimile: [phone]

## 2018-01-15 ASSESSMENT — ENCOUNTER SYMPTOMS
ABDOMINAL PAIN: 0
PND: 0
FALLS: 0
BRUISES/BLEEDS EASILY: 0
BLURRED VISION: 0
NAUSEA: 0
COUGH: 0
CHILLS: 0
WEAKNESS: 0
FOCAL WEAKNESS: 0
CLAUDICATION: 0
SHORTNESS OF BREATH: 0
SORE THROAT: 0
PALPITATIONS: 0
DIZZINESS: 0
FEVER: 0

## 2018-01-15 NOTE — PROGRESS NOTES
Subjective:   Troy Roberto is a 83 y.o. male who presents today for follow-up of her history of mild aortic valve, left bundle branch block, hypertension    He had a fall in December with elevated CPKs statin was stopped he follows with neurology      Past Medical History:   Diagnosis Date   • Aortic valve disorders    • Atrial fibrillation (CMS-HCC)    • Disorders of bilirubin excretion    • Long term (current) use of anticoagulants    • Mild aortic stenosis    • Mitral valve disorders(424.0)    • Pulmonary hypertension    • Pure hypercholesterolemia    • TIA (transient ischemic attack) 2014    30 minutes of dysphasia      Past Surgical History:   Procedure Laterality Date   • SHOULDER SURGERY     • TONSILLECTOMY       Family History   Problem Relation Age of Onset   • Stroke Mother       at 83   • Heart Failure Father       at 84, also has h/o CVA     History   Smoking Status   • Never Smoker   Smokeless Tobacco   • Never Used     Not on File  Outpatient Encounter Prescriptions as of 2018   Medication Sig Dispense Refill   • MYRBETRIQ 50 MG TABLET SR 24 HR GIVE 1 TABLET BY MOUTH DAILY  0   • gabapentin (NEURONTIN) 300 MG Cap Take 300 mg by mouth 3 times a day.     • diltiazem CD (CARDIZEM CD) 120 MG CAPSULE SR 24 HR Take 120 mg by mouth every day.     • furosemide (LASIX) 20 MG Tab Take 20 mg by mouth 2 times a day.     • potassium chloride SA (K-DUR) 10 MEQ Tab CR Take 10 mEq by mouth 2 times a day.     • warfarin (COUMADIN) 5 MG TABS Take 5 mg by mouth every bedtime. 4.5mg     • [DISCONTINUED] lisinopril (PRINIVIL) 2.5 MG Tab Take 1 Tab by mouth every day. 30 Tab 6   • [DISCONTINUED] oxybutynin (DITROPAN) 5 MG TABS Take 5 mg by mouth every bedtime.     • [DISCONTINUED] finasteride (PROSCAR) 5 MG TABS Take 5 mg by mouth every day.     • [DISCONTINUED] metoprolol (LOPRESSOR) 50 MG TABS Take 25 mg by mouth 2 times a day.       No facility-administered encounter medications on file as of  "1/9/2018.      Review of Systems   Constitutional: Negative for chills and fever.   HENT: Negative for sore throat.    Eyes: Negative for blurred vision.   Respiratory: Negative for cough and shortness of breath.    Cardiovascular: Negative for chest pain, palpitations, claudication, leg swelling and PND.   Gastrointestinal: Negative for abdominal pain and nausea.   Musculoskeletal: Negative for falls and joint pain.   Skin: Negative for rash.   Neurological: Negative for dizziness, focal weakness and weakness.   Endo/Heme/Allergies: Does not bruise/bleed easily.        Objective:   /70   Pulse (!) 137   Ht 1.88 m (6' 2\")   Wt 95.3 kg (210 lb)   SpO2 98%   BMI 26.96 kg/m²     Physical Exam   Constitutional: No distress.   HENT:   Mouth/Throat: Oropharynx is clear and moist.   Eyes: No scleral icterus.   Neck: Neck supple. No JVD present.   Cardiovascular: Normal rate, regular rhythm, normal heart sounds and intact distal pulses.  Exam reveals no gallop and no friction rub.    No murmur heard.  Pulmonary/Chest: Effort normal. He has no rales.   Abdominal: Soft. Bowel sounds are normal. There is no tenderness.   Musculoskeletal: He exhibits no edema.   Neurological: He is alert.   Skin: No rash noted. He is not diaphoretic.   Psychiatric: He has a normal mood and affect.       Assessment:     1. Transient cerebral ischemia, unspecified type     2. Mild aortic stenosis     3. Chronic atrial fibrillation (CMS-HCC)     4. Chronic anticoagulation  COMP METABOLIC PANEL    CBC WITH DIFFERENTIAL   5. Statin myopathy  TSH WITH REFLEX TO FT4    WESTERGREN SED RATE    CPK - TOTAL SERUM       Medical Decision Making:  Today's Assessment / Status / Plan:     It was my pleasure to meet with Mr. Roberto.    His blood pressure is well controlled    We will update his labs    I will see Mr. Roberto back in 1 year time and encouraged him to follow up with us over the phone or e-mail using my MyChart as issues arise.    It " is my pleasure to participate in the care of Mr. Roberto.  Please do not hesitate to contact me with questions or concerns.    Moose yTler MD PhD [facility]  Cardiologist [facility]

## 2021-06-30 NOTE — PROGRESS NOTES
OP Anticoagulation Telephone Note    Date: 1/4/2017  Anticoagulation Summary as of 1/4/2017     INR goal 2.0-3.0    Selected INR 2.5 (12/27/2016)    Maintenance plan 5 mg (5 mg x 1) every day    Weekly total 35 mg    No change documented Cathy Stephens, Med Ass't    Plan last modified Estelle Malone PHARMD (3/2/2016)    Next INR check 1/10/2017    Target end date Indefinite      Anticoagulation Episode Summary     INR check location Home [facility]    Preferred lab     Send INR reminders to     Yadira Donaldson       Anticoagulation Care Providers     Provider Role Specialty Phone number    Shalom Garzon M.D. Referring Cardiology [phone]    [facility] Responsible  [phone]    KAI VasquezD Responsible          Anticoagulation Patient Findings   Negatives Missed Doses, Extra Doses, Medication Changes, Antibiotic Use, Diet Changes, Dental/Other Procedures, Hospitalization, Bleeding Gums, Nose Bleeds, Blood in Urine, Blood in Stool, Any Bruising, Other Complaints      Plan:  Spoke with patient on the phone. Patient is therapeutic today. Patient denies any changes in medications or diet. Patient denies any signs or symptoms of bleeding or clotting. Instructed patient to call clinic if any unusual bleeding or bruising occurs. Will continue dosing as outlined above. Will follow-up with patient in 2 weeks.    Cathy Stephens, Medical Assistant    1/20/2017    Concur with plan outlined above    Ming Samuel, Yas     moderate assist (50% patients effort)